# Patient Record
Sex: MALE | Race: WHITE | Employment: FULL TIME | URBAN - METROPOLITAN AREA
[De-identification: names, ages, dates, MRNs, and addresses within clinical notes are randomized per-mention and may not be internally consistent; named-entity substitution may affect disease eponyms.]

---
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
## 2019-09-14 NOTE — PROGRESS NOTES
Subjective:     Dale Tinoco, 17 y.o., male    Chief Complaint   Patient presents with   • Diabetes       Last A1C: 7.1% on 11/3/17        Today's A1C: 6.9% on 2/9/18    HPI:    Reason for consult: Type 1 Diabetes Mellitus  Current insulin doses:   Humalog 12am= 1.35; 430am= 1.4; 11am= 1.25; 730pm=1.5; IC 1:8 correct 1:50>130  Total basal= 32.5      Total daily=62-98    Review of: [employer] pump shows multiple food boluses daily, ie 4-7 per day. There are site changes every 2-3 days. He is not using a linked meter. I am unable to Download his One Touch because it has the wrong year. I manually review his other meter and it shows only 2 lows in the last week. Am's are usually a bit high, the rest of the day has intermittent highs in no specific pattern.       Dale was diagnosed with Type 1 Diabetes at 16mo of age. He has been on pump therapy for >10yrs and generally has good control. Formerly he was on an [employer] Pump. He  received a letter from [employer] alerting him to their discontinuation of service when warranty is out. He switched to [employer] in Jan 2018.    Here today with mom. He notes changing pump to [employer]onic Brand about 1 month ago. He is still in training with it. He prefers to not wear cgm and also do manual boluses. He has not been carb counting. He reports have about 3 lows in the last month. He usually treats before checking. He reports feeling his lows, usually weak or shaky. 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 He is scheduled for [place]. Mom is trying to set up home delivery. They are using [employer]. She needs rx for the new pumps meter strips.    No Known Allergies      Last Eye Exam: 5/17     Last Labs: 9/12/17, all ok     PMH: Type 1 Diabetes diagnosed at 16mo; [employer] Pump  Upgrades 2009, 6/13, not using bolus wizard                        Hypoglycemic Seizure on 7/13     PSH: appendectomy     FH: +type 1 diabetes in cousins; +thyroid disease in several     SH: Lives with mom, dad, 2 older  "sisters, HS graduate in 5/17, now working for a , plans on doing a mission for his [employer] and then maybe college in [place].     ROS None  All other systems reviewed and are negative     Objective:     Blood pressure 122/82, height 1.879 m (6' 1.97\"), weight 102.3 kg (225 lb 8.5 oz).     Physical Exam:  Constitutional: Well-nourished.  No distress.   Skin: Skin is warm and dry. No rash noted. Lipohypertrophy bilat puffyness on buttocks mu h better  Head: Atraumatic without lesions.  Eyes:  Pupils are equal, round, and reactive to light. No scleral icterus. EOM intact  Mouth/Throat: Tongue normal. Oropharynx is clear and moist. Posterior pharynx without erythema or exudates.  Neck: Supple, trachea midline. No thyromegaly present. No cervical lymphadenopathy.  Cardiovascular: Regular rate and rhythm. No murmurs. No rubs. No gallop  Chest: Effort normal. Clear to auscultation throughout.  Abdomen: Soft, non tender, and without distention. Active bowel sounds in all four quadrants. No rebound, guarding, masses or hepatosplenomegaly.  Extremities: No cyanosis, clubbing, erythema, nor edema.   Neurological: Alert and oriented x 3.  Psychiatric:  Behavior, mood, and affect are appropriate.        Assessment and Plan:     1. Type 1 diabetes mellitus with hyperglycemia (CMS-HCC)  POCT Hemoglobin A1C    glucose blood (BATSHEVA CONTOUR NEXT TEST) strip       The following treatment plan was discussed: No dose changes. His A1c is so low (represents lower than what I see on meters),  I am concerned he may be having more lows than he realizes and is unable to feel them. I would like him to check some overnight numbers. Also consider getting trained on cgm and wearing it briefly so he can get some data on possible lows. Recommend visit with CDE to go over carb counting. Paper rx for travel.     Followup: Return when in town.           " Nutrition, metabolism, and development symptoms

## 2020-03-02 ENCOUNTER — TELEPHONE (OUTPATIENT)
Dept: PEDIATRIC ENDOCRINOLOGY | Facility: MEDICAL CENTER | Age: 20
End: 2020-03-02

## 2020-03-02 NOTE — TELEPHONE ENCOUNTER
Informed mother that Margie trinidad APN, encouraged to call insurance and see which adult endocrinologists are even contracted with their insurance-- mother verbalized understanding.

## 2022-07-22 ENCOUNTER — OFFICE VISIT (OUTPATIENT)
Dept: URGENT CARE | Facility: CLINIC | Age: 22
End: 2022-07-22
Payer: COMMERCIAL

## 2022-07-22 VITALS
WEIGHT: 220 LBS | TEMPERATURE: 97 F | DIASTOLIC BLOOD PRESSURE: 84 MMHG | BODY MASS INDEX: 28.23 KG/M2 | RESPIRATION RATE: 17 BRPM | OXYGEN SATURATION: 98 % | SYSTOLIC BLOOD PRESSURE: 127 MMHG | HEART RATE: 68 BPM | HEIGHT: 74 IN

## 2022-07-22 DIAGNOSIS — E16.2 HYPOGLYCEMIA: ICD-10-CM

## 2022-07-22 DIAGNOSIS — M25.512 ACUTE PAIN OF LEFT SHOULDER: Primary | ICD-10-CM

## 2022-07-22 LAB — GLUCOSE SERPL-MCNC: 142 MG/DL (ref 70–110)

## 2022-07-22 PROCEDURE — 3079F PR MOST RECENT DIASTOLIC BLOOD PRESSURE 80-89 MM HG: ICD-10-PCS | Mod: CPTII,S$GLB,, | Performed by: PHYSICIAN ASSISTANT

## 2022-07-22 PROCEDURE — 3074F SYST BP LT 130 MM HG: CPT | Mod: CPTII,S$GLB,, | Performed by: PHYSICIAN ASSISTANT

## 2022-07-22 PROCEDURE — 99204 PR OFFICE/OUTPT VISIT, NEW, LEVL IV, 45-59 MIN: ICD-10-PCS | Mod: S$GLB,,, | Performed by: PHYSICIAN ASSISTANT

## 2022-07-22 PROCEDURE — 3008F PR BODY MASS INDEX (BMI) DOCUMENTED: ICD-10-PCS | Mod: CPTII,S$GLB,, | Performed by: PHYSICIAN ASSISTANT

## 2022-07-22 PROCEDURE — 3074F PR MOST RECENT SYSTOLIC BLOOD PRESSURE < 130 MM HG: ICD-10-PCS | Mod: CPTII,S$GLB,, | Performed by: PHYSICIAN ASSISTANT

## 2022-07-22 PROCEDURE — 73030 XR SHOULDER COMPLETE 2 OR MORE VIEWS LEFT: ICD-10-PCS | Mod: FY,LT,S$GLB, | Performed by: RADIOLOGY

## 2022-07-22 PROCEDURE — 3079F DIAST BP 80-89 MM HG: CPT | Mod: CPTII,S$GLB,, | Performed by: PHYSICIAN ASSISTANT

## 2022-07-22 PROCEDURE — 1159F PR MEDICATION LIST DOCUMENTED IN MEDICAL RECORD: ICD-10-PCS | Mod: CPTII,S$GLB,, | Performed by: PHYSICIAN ASSISTANT

## 2022-07-22 PROCEDURE — 82962 POCT GLUCOSE, HAND-HELD DEVICE: ICD-10-PCS | Mod: S$GLB,,, | Performed by: PHYSICIAN ASSISTANT

## 2022-07-22 PROCEDURE — 99204 OFFICE O/P NEW MOD 45 MIN: CPT | Mod: S$GLB,,, | Performed by: PHYSICIAN ASSISTANT

## 2022-07-22 PROCEDURE — 82962 GLUCOSE BLOOD TEST: CPT | Mod: S$GLB,,, | Performed by: PHYSICIAN ASSISTANT

## 2022-07-22 PROCEDURE — 3008F BODY MASS INDEX DOCD: CPT | Mod: CPTII,S$GLB,, | Performed by: PHYSICIAN ASSISTANT

## 2022-07-22 PROCEDURE — 1160F RVW MEDS BY RX/DR IN RCRD: CPT | Mod: CPTII,S$GLB,, | Performed by: PHYSICIAN ASSISTANT

## 2022-07-22 PROCEDURE — 1159F MED LIST DOCD IN RCRD: CPT | Mod: CPTII,S$GLB,, | Performed by: PHYSICIAN ASSISTANT

## 2022-07-22 PROCEDURE — 73030 X-RAY EXAM OF SHOULDER: CPT | Mod: FY,LT,S$GLB, | Performed by: RADIOLOGY

## 2022-07-22 PROCEDURE — 1160F PR REVIEW ALL MEDS BY PRESCRIBER/CLIN PHARMACIST DOCUMENTED: ICD-10-PCS | Mod: CPTII,S$GLB,, | Performed by: PHYSICIAN ASSISTANT

## 2022-07-22 RX ORDER — CYCLOBENZAPRINE HCL 10 MG
10 TABLET ORAL 3 TIMES DAILY PRN
Qty: 30 TABLET | Refills: 0 | Status: SHIPPED | OUTPATIENT
Start: 2022-07-22 | End: 2022-08-01

## 2022-07-22 RX ORDER — INSULIN LISPRO 100 [IU]/ML
INJECTION, SOLUTION INTRAVENOUS; SUBCUTANEOUS
COMMUNITY
Start: 2022-06-16

## 2022-07-22 RX ORDER — NAPROXEN 500 MG/1
500 TABLET ORAL 2 TIMES DAILY WITH MEALS
Qty: 30 TABLET | Refills: 1 | Status: SHIPPED | OUTPATIENT
Start: 2022-07-22

## 2022-07-22 RX ORDER — INSULIN LISPRO 100 [IU]/ML
8 INJECTION, SOLUTION INTRAVENOUS; SUBCUTANEOUS
COMMUNITY

## 2022-07-22 RX ORDER — INSULIN LISPRO 100 [IU]/ML
70 INJECTION, SOLUTION SUBCUTANEOUS
COMMUNITY

## 2022-07-22 RX ORDER — INSULIN GLARGINE-YFGN 100 [IU]/ML
INJECTION, SOLUTION SUBCUTANEOUS
COMMUNITY
Start: 2022-07-10

## 2022-07-22 RX ORDER — INSULIN LISPRO 100 [IU]/ML
INJECTION, SOLUTION INTRAVENOUS; SUBCUTANEOUS
COMMUNITY
Start: 2022-03-26

## 2022-07-22 NOTE — PROGRESS NOTES
"Subjective:       Patient ID: Rinku Esparza is a 22 y.o. male.    Vitals:  height is 6' 2" (1.88 m) and weight is 99.8 kg (220 lb). His oral temperature is 97.4 °F (36.3 °C). His blood pressure is 127/84 and his pulse is 68. His respiration is 17 and oxygen saturation is 98%.     Chief Complaint: Seizures    This happened today. He states that he is a type 1 diabetic. When he woke up he seen that his blood sugars were low, and this caused him to have a seizure. He states that a friend said it lasted for 7 minutes. He states that his left arm is hurting him and he cant move it.  22-year-old healthy male with history of type 1 diabetes here for referral to Endocrinology because he had issues with her sugar low.  Patient states he could feel his sugar dropped below drink Gatorade but it was too late he states.  This has happened before per history    Seizures   This is a new problem. The current episode started 3 to 5 hours ago. There was 1 seizure. The most recent episode lasted more than 5 minutes. Associated symptoms include sleepiness, confusion and headaches. Pertinent negatives include no speech difficulty and no visual disturbance. Characteristics do not include eye blinking, eye deviation, bowel incontinence, bladder incontinence, rhythmic jerking, loss of consciousness, bit tongue, apnea or cyanosis. The episode was witnessed. There was no sensation of an aura present. The seizures did not continue in the ED. Possible causes do not include medication or dosage change, sleep deprivation, missed seizure meds, recent illness or change in alcohol use. There has been no fever.       Respiratory: Negative for sleep apnea.    Gastrointestinal: Negative for bowel incontinence.   Genitourinary: Negative for bladder incontinence.   Neurological: Positive for headaches and seizures. Negative for speech difficulty and loss of consciousness.   Psychiatric/Behavioral: Positive for confusion.       Objective:      Physical " Exam   Constitutional: He is oriented to person, place, and time. He appears well-developed. He is cooperative.  Non-toxic appearance. He does not appear ill. No distress.   HENT:   Head: Normocephalic and atraumatic.   Nose: Nose normal. No rhinorrhea or congestion.   Mouth/Throat: Oropharynx is clear and moist and mucous membranes are normal. No oropharyngeal exudate or posterior oropharyngeal erythema.   Eyes: Conjunctivae and lids are normal. Pupils are equal, round, and reactive to light. Right eye exhibits no discharge. Left eye exhibits no discharge. Extraocular movement intact   Neck: Trachea normal and phonation normal. Neck supple.   Cardiovascular: Normal rate, regular rhythm, normal heart sounds and normal pulses.   Pulmonary/Chest: Effort normal. No stridor. No respiratory distress. He has no rhonchi. He has no rales.   Abdominal: Normal appearance and bowel sounds are normal. He exhibits no distension, no abdominal bruit, no pulsatile midline mass and no mass. Soft. There is no abdominal tenderness. There is no guarding.   Musculoskeletal:         General: Tenderness present. No deformity.        Arms:    Neurological: He is alert and oriented to person, place, and time. He has normal strength and normal reflexes. No sensory deficit.   Skin: Skin is warm, dry, intact, not diaphoretic and no rash. Capillary refill takes less than 2 seconds. No bruising and No lesion jaundice  Psychiatric: His speech is normal and behavior is normal. Judgment and thought content normal.   Nursing note and vitals reviewed.  = BLOOD [id number]  Results for orders placed or performed in visit on 07/22/22   POCT Glucose, Hand-Held Device   Result Value Ref Range    POC Glucose 142 (A) 70 - 110 MG/DL    No results found.     MY RADIOLOGY INTERPRETATION IS IS NO ACUTE BONY PROCESS NO FRACTURE APPRECIATED BUT I CANNOT RULE OUT OCCULT PROCESS AND THE OFFICIAL RADIOLOGY READ FOLLOW.  Assessment:       1. Acute pain of left  shoulder    2. Hypoglycemia          Plan:         Acute pain of left shoulder  -     X-Ray Shoulder 2 or More Views Left; Future; Expected date: 07/22/2022  -     naproxen (NAPROSYN) 500 MG tablet; Take 1 tablet (500 mg total) by mouth 2 (two) times daily with meals.  Dispense: 30 tablet; Refill: 1  -     cyclobenzaprine (FLEXERIL) 10 MG tablet; Take 1 tablet (10 mg total) by mouth 3 (three) times daily as needed for Muscle spasms.  Dispense: 30 tablet; Refill: 0    Hypoglycemia  -     POCT Glucose, Hand-Held Device  -     Ambulatory referral/consult to Endocrinology    DISCUSSED WITH THE PATIENT EITHER REDUCING HIS DOSE OF PMNS WITH OR DECREASE IN THAT GIVEN THE 2ND SHOT IN THE MORNING AFTER WAKING.     Follow up if symptoms worsen or fail to improve, for F/U with PCP or ED.   Patient Instructions   CONSIDER SPLITTING YELLOW LONG-ACTING INSULIN TO 30 UNITS AT NIGHT AND 15 UNITS IN THE MORNING .

## 2022-07-27 ENCOUNTER — TELEPHONE (OUTPATIENT)
Dept: ADMINISTRATIVE | Facility: OTHER | Age: 22
End: 2022-07-27
Payer: COMMERCIAL

## 2023-06-07 PROBLEM — S43.432A SUPERIOR GLENOID LABRUM LESION OF LEFT SHOULDER: Status: ACTIVE | Noted: 2023-06-07

## 2023-11-13 ENCOUNTER — NON-PROVIDER VISIT (OUTPATIENT)
Dept: URGENT CARE | Facility: CLINIC | Age: 23
End: 2023-11-13

## 2023-11-13 DIAGNOSIS — Z02.1 PRE-EMPLOYMENT DRUG SCREENING: ICD-10-CM

## 2023-11-13 LAB
AMP AMPHETAMINE: NEGATIVE
COC COCAINE: NEGATIVE
INT CON NEG: NORMAL
INT CON POS: NORMAL
MET METHAMPHETAMINES: NEGATIVE
OPI OPIATES: NEGATIVE
PCP PHENCYCLIDINE: NEGATIVE
POC DRUG COMMENT 753798-OCCUPATIONAL HEALTH: NEGATIVE
THC: NEGATIVE

## 2023-11-13 PROCEDURE — 80305 DRUG TEST PRSMV DIR OPT OBS: CPT | Performed by: PHYSICIAN ASSISTANT
